# Patient Record
Sex: MALE | Race: OTHER | HISPANIC OR LATINO
[De-identification: names, ages, dates, MRNs, and addresses within clinical notes are randomized per-mention and may not be internally consistent; named-entity substitution may affect disease eponyms.]

---

## 2017-12-01 ENCOUNTER — LABORATORY RESULT (OUTPATIENT)
Age: 27
End: 2017-12-01

## 2017-12-01 ENCOUNTER — APPOINTMENT (OUTPATIENT)
Dept: INTERNAL MEDICINE | Facility: CLINIC | Age: 27
End: 2017-12-01

## 2017-12-01 ENCOUNTER — OUTPATIENT (OUTPATIENT)
Dept: OUTPATIENT SERVICES | Facility: HOSPITAL | Age: 27
LOS: 1 days | End: 2017-12-01
Payer: SELF-PAY

## 2017-12-01 VITALS
BODY MASS INDEX: 24.27 KG/M2 | DIASTOLIC BLOOD PRESSURE: 70 MMHG | WEIGHT: 162 LBS | HEIGHT: 68.5 IN | SYSTOLIC BLOOD PRESSURE: 130 MMHG

## 2017-12-01 DIAGNOSIS — R29.898 OTHER SYMPTOMS AND SIGNS INVOLVING THE MUSCULOSKELETAL SYSTEM: ICD-10-CM

## 2017-12-01 DIAGNOSIS — I10 ESSENTIAL (PRIMARY) HYPERTENSION: ICD-10-CM

## 2017-12-01 DIAGNOSIS — Z78.9 OTHER SPECIFIED HEALTH STATUS: ICD-10-CM

## 2017-12-01 DIAGNOSIS — F17.200 NICOTINE DEPENDENCE, UNSPECIFIED, UNCOMPLICATED: ICD-10-CM

## 2017-12-01 PROCEDURE — 85027 COMPLETE CBC AUTOMATED: CPT

## 2017-12-01 PROCEDURE — 86803 HEPATITIS C AB TEST: CPT

## 2017-12-01 PROCEDURE — 90688 IIV4 VACCINE SPLT 0.5 ML IM: CPT

## 2017-12-01 PROCEDURE — G0008: CPT

## 2017-12-01 PROCEDURE — 80053 COMPREHEN METABOLIC PANEL: CPT

## 2017-12-01 PROCEDURE — G0463: CPT

## 2017-12-01 PROCEDURE — 87389 HIV-1 AG W/HIV-1&-2 AB AG IA: CPT

## 2017-12-01 PROCEDURE — 90471 IMMUNIZATION ADMIN: CPT

## 2017-12-01 PROCEDURE — 86780 TREPONEMA PALLIDUM: CPT

## 2017-12-01 PROCEDURE — 80061 LIPID PANEL: CPT

## 2017-12-01 PROCEDURE — 90715 TDAP VACCINE 7 YRS/> IM: CPT

## 2017-12-01 RX ORDER — PSYLLIUM HUSK 0.4 G
1000-800 CAPSULE ORAL
Qty: 90 | Refills: 3 | Status: ACTIVE | COMMUNITY
Start: 2017-12-01

## 2017-12-01 RX ORDER — ACETAMINOPHEN 325 MG/1
325 TABLET, FILM COATED ORAL
Qty: 30 | Refills: 0 | Status: ACTIVE | COMMUNITY
Start: 2017-12-01

## 2017-12-02 LAB
ALBUMIN SERPL ELPH-MCNC: 4.9 G/DL — SIGNIFICANT CHANGE UP (ref 3.3–5)
ALP SERPL-CCNC: 75 U/L — SIGNIFICANT CHANGE UP (ref 40–120)
ALT FLD-CCNC: 12 U/L — SIGNIFICANT CHANGE UP (ref 10–45)
ANION GAP SERPL CALC-SCNC: 17 MMOL/L — SIGNIFICANT CHANGE UP (ref 5–17)
AST SERPL-CCNC: 19 U/L — SIGNIFICANT CHANGE UP (ref 10–40)
BILIRUB SERPL-MCNC: 0.4 MG/DL — SIGNIFICANT CHANGE UP (ref 0.2–1.2)
BUN SERPL-MCNC: 16 MG/DL — SIGNIFICANT CHANGE UP (ref 7–23)
C TRACH RRNA SPEC QL NAA+PROBE: SIGNIFICANT CHANGE UP
CALCIUM SERPL-MCNC: 9.9 MG/DL — SIGNIFICANT CHANGE UP (ref 8.4–10.5)
CHLORIDE SERPL-SCNC: 100 MMOL/L — SIGNIFICANT CHANGE UP (ref 96–108)
CHOLEST SERPL-MCNC: 144 MG/DL — SIGNIFICANT CHANGE UP (ref 10–199)
CO2 SERPL-SCNC: 26 MMOL/L — SIGNIFICANT CHANGE UP (ref 22–31)
CREAT SERPL-MCNC: 0.92 MG/DL — SIGNIFICANT CHANGE UP (ref 0.5–1.3)
GLUCOSE SERPL-MCNC: 74 MG/DL — SIGNIFICANT CHANGE UP (ref 70–99)
HCT VFR BLD CALC: 45.8 % — SIGNIFICANT CHANGE UP (ref 39–50)
HCV AB S/CO SERPL IA: 0.16 S/CO — SIGNIFICANT CHANGE UP
HCV AB SERPL-IMP: SIGNIFICANT CHANGE UP
HDLC SERPL-MCNC: 51 MG/DL — SIGNIFICANT CHANGE UP (ref 40–125)
HGB BLD-MCNC: 15.8 G/DL — SIGNIFICANT CHANGE UP (ref 13–17)
HIV 1+2 AB+HIV1 P24 AG SERPL QL IA: SIGNIFICANT CHANGE UP
LIPID PNL WITH DIRECT LDL SERPL: 76 MG/DL — SIGNIFICANT CHANGE UP
MCHC RBC-ENTMCNC: 29.9 PG — SIGNIFICANT CHANGE UP (ref 27–34)
MCHC RBC-ENTMCNC: 34.5 GM/DL — SIGNIFICANT CHANGE UP (ref 32–36)
MCV RBC AUTO: 86.6 FL — SIGNIFICANT CHANGE UP (ref 80–100)
N GONORRHOEA RRNA SPEC QL NAA+PROBE: SIGNIFICANT CHANGE UP
PLATELET # BLD AUTO: 210 K/UL — SIGNIFICANT CHANGE UP (ref 150–400)
POTASSIUM SERPL-MCNC: 4.5 MMOL/L — SIGNIFICANT CHANGE UP (ref 3.5–5.3)
POTASSIUM SERPL-SCNC: 4.5 MMOL/L — SIGNIFICANT CHANGE UP (ref 3.5–5.3)
PROT SERPL-MCNC: 8.2 G/DL — SIGNIFICANT CHANGE UP (ref 6–8.3)
RBC # BLD: 5.29 M/UL — SIGNIFICANT CHANGE UP (ref 4.2–5.8)
RBC # FLD: 13.4 % — SIGNIFICANT CHANGE UP (ref 10.3–14.5)
SODIUM SERPL-SCNC: 143 MMOL/L — SIGNIFICANT CHANGE UP (ref 135–145)
SPECIMEN SOURCE: SIGNIFICANT CHANGE UP
T PALLIDUM AB TITR SER: NEGATIVE — SIGNIFICANT CHANGE UP
TOTAL CHOLESTEROL/HDL RATIO MEASUREMENT: 2.8 RATIO — LOW (ref 3.4–9.6)
TRIGL SERPL-MCNC: 83 MG/DL — SIGNIFICANT CHANGE UP (ref 10–149)
WBC # BLD: 6.98 K/UL — SIGNIFICANT CHANGE UP (ref 3.8–10.5)
WBC # FLD AUTO: 6.98 K/UL — SIGNIFICANT CHANGE UP (ref 3.8–10.5)

## 2017-12-11 DIAGNOSIS — M75.81 OTHER SHOULDER LESIONS, RIGHT SHOULDER: ICD-10-CM

## 2017-12-11 DIAGNOSIS — Z78.9 OTHER SPECIFIED HEALTH STATUS: ICD-10-CM

## 2017-12-11 DIAGNOSIS — Z11.3 ENCOUNTER FOR SCREENING FOR INFECTIONS WITH A PREDOMINANTLY SEXUAL MODE OF TRANSMISSION: ICD-10-CM

## 2017-12-11 DIAGNOSIS — Z23 ENCOUNTER FOR IMMUNIZATION: ICD-10-CM

## 2017-12-11 DIAGNOSIS — F17.200 NICOTINE DEPENDENCE, UNSPECIFIED, UNCOMPLICATED: ICD-10-CM

## 2017-12-11 DIAGNOSIS — R29.898 OTHER SYMPTOMS AND SIGNS INVOLVING THE MUSCULOSKELETAL SYSTEM: ICD-10-CM

## 2018-01-02 ENCOUNTER — FORM ENCOUNTER (OUTPATIENT)
Age: 28
End: 2018-01-02

## 2018-01-03 ENCOUNTER — OUTPATIENT (OUTPATIENT)
Dept: OUTPATIENT SERVICES | Facility: HOSPITAL | Age: 28
LOS: 1 days | End: 2018-01-03
Payer: SELF-PAY

## 2018-01-03 ENCOUNTER — APPOINTMENT (OUTPATIENT)
Dept: ORTHOPEDIC SURGERY | Facility: HOSPITAL | Age: 28
End: 2018-01-03

## 2018-01-03 VITALS
RESPIRATION RATE: 16 BRPM | BODY MASS INDEX: 23.82 KG/M2 | SYSTOLIC BLOOD PRESSURE: 135 MMHG | DIASTOLIC BLOOD PRESSURE: 77 MMHG | WEIGHT: 159 LBS | HEART RATE: 76 BPM | HEIGHT: 68.5 IN

## 2018-01-03 DIAGNOSIS — M25.561 PAIN IN RIGHT KNEE: ICD-10-CM

## 2018-01-03 DIAGNOSIS — M75.81 OTHER SHOULDER LESIONS, RIGHT SHOULDER: ICD-10-CM

## 2018-01-03 DIAGNOSIS — M79.643 PAIN IN UNSPECIFIED HAND: ICD-10-CM

## 2018-01-03 PROCEDURE — G0463: CPT

## 2018-01-03 PROCEDURE — 73564 X-RAY EXAM KNEE 4 OR MORE: CPT

## 2018-01-03 PROCEDURE — 73030 X-RAY EXAM OF SHOULDER: CPT | Mod: 26,RT

## 2018-01-03 PROCEDURE — 73030 X-RAY EXAM OF SHOULDER: CPT

## 2018-01-03 PROCEDURE — 73564 X-RAY EXAM KNEE 4 OR MORE: CPT | Mod: 26,RT

## 2018-02-06 ENCOUNTER — FORM ENCOUNTER (OUTPATIENT)
Age: 28
End: 2018-02-06

## 2018-02-07 ENCOUNTER — OUTPATIENT (OUTPATIENT)
Dept: OUTPATIENT SERVICES | Facility: HOSPITAL | Age: 28
LOS: 1 days | End: 2018-02-07
Payer: SELF-PAY

## 2018-02-07 ENCOUNTER — APPOINTMENT (OUTPATIENT)
Dept: ORTHOPEDIC SURGERY | Facility: HOSPITAL | Age: 28
End: 2018-02-07

## 2018-02-07 ENCOUNTER — OUTPATIENT (OUTPATIENT)
Dept: OUTPATIENT SERVICES | Facility: HOSPITAL | Age: 28
LOS: 1 days | End: 2018-02-07
Payer: COMMERCIAL

## 2018-02-07 ENCOUNTER — APPOINTMENT (OUTPATIENT)
Dept: MRI IMAGING | Facility: CLINIC | Age: 28
End: 2018-02-07
Payer: COMMERCIAL

## 2018-02-07 VITALS
RESPIRATION RATE: 14 BRPM | WEIGHT: 160 LBS | SYSTOLIC BLOOD PRESSURE: 120 MMHG | HEART RATE: 69 BPM | DIASTOLIC BLOOD PRESSURE: 74 MMHG | BODY MASS INDEX: 23.97 KG/M2 | HEIGHT: 68.5 IN

## 2018-02-07 DIAGNOSIS — M79.643 PAIN IN UNSPECIFIED HAND: ICD-10-CM

## 2018-02-07 DIAGNOSIS — M25.511 PAIN IN RIGHT SHOULDER: ICD-10-CM

## 2018-02-07 DIAGNOSIS — M22.2X1 PATELLOFEMORAL DISORDERS, RIGHT KNEE: ICD-10-CM

## 2018-02-07 PROCEDURE — G0463: CPT

## 2018-02-07 PROCEDURE — 70250 X-RAY EXAM OF SKULL: CPT | Mod: 26

## 2018-02-07 PROCEDURE — 70250 X-RAY EXAM OF SKULL: CPT

## 2018-02-07 PROCEDURE — 73221 MRI JOINT UPR EXTREM W/O DYE: CPT

## 2018-02-07 PROCEDURE — 73221 MRI JOINT UPR EXTREM W/O DYE: CPT | Mod: 26,RT

## 2018-02-07 RX ORDER — NAPROXEN 500 MG/1
500 TABLET ORAL
Qty: 30 | Refills: 1 | Status: ACTIVE | COMMUNITY
Start: 2018-02-07 | End: 1900-01-01

## 2018-02-08 DIAGNOSIS — M25.511 PAIN IN RIGHT SHOULDER: ICD-10-CM

## 2018-02-08 DIAGNOSIS — M22.2X1 PATELLOFEMORAL DISORDERS, RIGHT KNEE: ICD-10-CM

## 2018-04-04 ENCOUNTER — APPOINTMENT (OUTPATIENT)
Dept: ORTHOPEDIC SURGERY | Facility: HOSPITAL | Age: 28
End: 2018-04-04

## 2018-07-23 PROBLEM — Z78.9 ALCOHOL USE: Status: ACTIVE | Noted: 2017-12-01

## 2019-03-08 ENCOUNTER — APPOINTMENT (OUTPATIENT)
Dept: INTERNAL MEDICINE | Facility: CLINIC | Age: 29
End: 2019-03-08

## 2019-03-14 ENCOUNTER — LABORATORY RESULT (OUTPATIENT)
Age: 29
End: 2019-03-14

## 2019-03-14 ENCOUNTER — OUTPATIENT (OUTPATIENT)
Dept: OUTPATIENT SERVICES | Facility: HOSPITAL | Age: 29
LOS: 1 days | End: 2019-03-14
Payer: SELF-PAY

## 2019-03-14 ENCOUNTER — APPOINTMENT (OUTPATIENT)
Dept: INTERNAL MEDICINE | Facility: CLINIC | Age: 29
End: 2019-03-14

## 2019-03-14 DIAGNOSIS — Z11.3 ENCOUNTER FOR SCREENING FOR INFECTIONS WITH A PREDOMINANTLY SEXUAL MODE OF TRANSMISSION: ICD-10-CM

## 2019-03-14 DIAGNOSIS — R10.13 EPIGASTRIC PAIN: ICD-10-CM

## 2019-03-14 DIAGNOSIS — I10 ESSENTIAL (PRIMARY) HYPERTENSION: ICD-10-CM

## 2019-03-14 DIAGNOSIS — R53.83 OTHER FATIGUE: ICD-10-CM

## 2019-03-15 PROBLEM — R53.83 FATIGUE: Status: ACTIVE | Noted: 2019-03-15

## 2019-03-15 PROBLEM — R10.13 EPIGASTRIC ABDOMINAL PAIN OF UNKNOWN ETIOLOGY: Status: ACTIVE | Noted: 2019-03-15

## 2019-03-15 PROBLEM — Z11.3 ROUTINE SCREENING FOR STI (SEXUALLY TRANSMITTED INFECTION): Status: ACTIVE | Noted: 2017-12-01

## 2019-03-15 LAB
ALBUMIN SERPL ELPH-MCNC: 4.3 G/DL — SIGNIFICANT CHANGE UP (ref 3.3–5)
ALP SERPL-CCNC: 79 U/L — SIGNIFICANT CHANGE UP (ref 40–120)
ALT FLD-CCNC: 11 U/L — SIGNIFICANT CHANGE UP (ref 10–45)
ANION GAP SERPL CALC-SCNC: 14 MMOL/L — SIGNIFICANT CHANGE UP (ref 5–17)
AST SERPL-CCNC: 17 U/L — SIGNIFICANT CHANGE UP (ref 10–40)
BILIRUB SERPL-MCNC: 0.2 MG/DL — SIGNIFICANT CHANGE UP (ref 0.2–1.2)
BUN SERPL-MCNC: 19 MG/DL — SIGNIFICANT CHANGE UP (ref 7–23)
C TRACH RRNA SPEC QL NAA+PROBE: SIGNIFICANT CHANGE UP
CALCIUM SERPL-MCNC: 9.1 MG/DL — SIGNIFICANT CHANGE UP (ref 8.4–10.5)
CHLORIDE SERPL-SCNC: 108 MMOL/L — SIGNIFICANT CHANGE UP (ref 96–108)
CO2 SERPL-SCNC: 21 MMOL/L — LOW (ref 22–31)
CREAT SERPL-MCNC: 0.98 MG/DL — SIGNIFICANT CHANGE UP (ref 0.5–1.3)
GLUCOSE SERPL-MCNC: 103 MG/DL — HIGH (ref 70–99)
HBA1C BLD-MCNC: 5.2 % — SIGNIFICANT CHANGE UP (ref 4–5.6)
HCT VFR BLD CALC: 45.5 % — SIGNIFICANT CHANGE UP (ref 39–50)
HGB BLD-MCNC: 14.7 G/DL — SIGNIFICANT CHANGE UP (ref 13–17)
HIV 1+2 AB+HIV1 P24 AG SERPL QL IA: SIGNIFICANT CHANGE UP
MCHC RBC-ENTMCNC: 28.3 PG — SIGNIFICANT CHANGE UP (ref 27–34)
MCHC RBC-ENTMCNC: 32.3 GM/DL — SIGNIFICANT CHANGE UP (ref 32–36)
MCV RBC AUTO: 87.5 FL — SIGNIFICANT CHANGE UP (ref 80–100)
N GONORRHOEA RRNA SPEC QL NAA+PROBE: SIGNIFICANT CHANGE UP
PLATELET # BLD AUTO: 237 K/UL — SIGNIFICANT CHANGE UP (ref 150–400)
POTASSIUM SERPL-MCNC: 4.2 MMOL/L — SIGNIFICANT CHANGE UP (ref 3.5–5.3)
POTASSIUM SERPL-SCNC: 4.2 MMOL/L — SIGNIFICANT CHANGE UP (ref 3.5–5.3)
PROT SERPL-MCNC: 7.3 G/DL — SIGNIFICANT CHANGE UP (ref 6–8.3)
RBC # BLD: 5.2 M/UL — SIGNIFICANT CHANGE UP (ref 4.2–5.8)
RBC # FLD: 12.9 % — SIGNIFICANT CHANGE UP (ref 10.3–14.5)
SODIUM SERPL-SCNC: 143 MMOL/L — SIGNIFICANT CHANGE UP (ref 135–145)
SPECIMEN SOURCE: SIGNIFICANT CHANGE UP
TSH SERPL-MCNC: 0.74 UIU/ML — SIGNIFICANT CHANGE UP (ref 0.27–4.2)
WBC # BLD: 7.96 K/UL — SIGNIFICANT CHANGE UP (ref 3.8–10.5)
WBC # FLD AUTO: 7.96 K/UL — SIGNIFICANT CHANGE UP (ref 3.8–10.5)

## 2019-03-15 RX ORDER — FAMOTIDINE 40 MG/1
40 TABLET, FILM COATED ORAL
Qty: 1 | Refills: 3 | Status: ACTIVE | COMMUNITY
Start: 2019-03-14 | End: 1900-01-01

## 2019-03-15 NOTE — ASSESSMENT
[FreeTextEntry1] : 28M h/o partial supraspinatus tear (sees Ortho) presents for acute eval of epigastric abn discomfort. \par \par #epigastric pain\par -gastritis vs PUD\par -check H pylori, limit use of NSAIDs, trial of pepcid \par \par #fatigue\par -check CBC, TSH, instructed on good sleep hygeine habits \par \par #HCM\par -STI screening\par -flu shot given today \par -check basic labs\par -him and his wife are also trying to conceive and given their age, recommend trial of 12 months before infertility w/u\par \par d/w Dr. Marquez \par Cong Caputo PGY3 \par

## 2019-03-15 NOTE — HISTORY OF PRESENT ILLNESS
[FreeTextEntry8] : 28M h/o partial supraspinatus tear (sees Ortho) presents for acute eval of epigastric abn discomfort. \par \par #epigastric pain\par Has had epigastric abn discomfort intermittently over past month. Worse before meals and sometimes improved with food. Has had no fever, BRBPR, melena, N/V. Occasionaly uses NSAIDs for joint pain. Immigrated from [facility] and has never check H pylori. No weight loss, F/C. No dysphagia/odynophagia. \par \par Also reports his wife and him are trying to conceive for the past 5 months without success. Asking if he needs to be worked up. Would like STI testing. Has had some fatigue the past month as well, but sleeps 8 hours a night.

## 2019-03-15 NOTE — PHYSICAL EXAM
[No Acute Distress] : no acute distress [Well Developed] : well developed [Normal Sclera/Conjunctiva] : normal sclera/conjunctiva [EOMI] : extraocular movements intact [Normal Oropharynx] : the oropharynx was normal [Supple] : supple [Clear to Auscultation] : lungs were clear to auscultation bilaterally [Regular Rhythm] : with a regular rhythm [Normal S1, S2] : normal S1 and S2 [No Murmur] : no murmur heard [No Edema] : there was no peripheral edema [Soft] : abdomen soft [Non Tender] : non-tender [Non-distended] : non-distended [No Joint Swelling] : no joint swelling [No Rash] : no rash [Normal Gait] : normal gait [No Focal Deficits] : no focal deficits [Normal Affect] : the affect was normal [Normal Mood] : the mood was normal

## 2019-03-15 NOTE — REVIEW OF SYSTEMS
[Abdominal Pain] : abdominal pain [Fever] : no fever [Vision Problems] : no vision problems [Sore Throat] : no sore throat [Chest Pain] : no chest pain [Shortness Of Breath] : no shortness of breath [Nausea] : no nausea [Constipation] : no constipation [Diarrhea] : no diarrhea [Vomiting] : no vomiting [Heartburn] : no heartburn [Melena] : no melena [Dysuria] : no dysuria [Muscle Pain] : no muscle pain [Skin Rash] : no skin rash [Headache] : no headache [Depression] : no depression [Easy Bleeding] : no easy bleeding

## 2019-03-18 DIAGNOSIS — R53.83 OTHER FATIGUE: ICD-10-CM

## 2019-03-18 DIAGNOSIS — R10.13 EPIGASTRIC PAIN: ICD-10-CM

## 2019-03-18 DIAGNOSIS — Z11.3 ENCOUNTER FOR SCREENING FOR INFECTIONS WITH A PREDOMINANTLY SEXUAL MODE OF TRANSMISSION: ICD-10-CM

## 2019-03-31 ENCOUNTER — LABORATORY RESULT (OUTPATIENT)
Age: 29
End: 2019-03-31

## 2019-04-01 PROCEDURE — 84443 ASSAY THYROID STIM HORMONE: CPT

## 2019-04-01 PROCEDURE — 87591 N.GONORRHOEAE DNA AMP PROB: CPT

## 2019-04-01 PROCEDURE — 36415 COLL VENOUS BLD VENIPUNCTURE: CPT

## 2019-04-01 PROCEDURE — 87338 HPYLORI STOOL AG IA: CPT

## 2019-04-01 PROCEDURE — 87491 CHLMYD TRACH DNA AMP PROBE: CPT

## 2019-04-01 PROCEDURE — 83036 HEMOGLOBIN GLYCOSYLATED A1C: CPT

## 2019-04-01 PROCEDURE — 80053 COMPREHEN METABOLIC PANEL: CPT

## 2019-04-01 PROCEDURE — G0463: CPT

## 2019-04-01 PROCEDURE — 87389 HIV-1 AG W/HIV-1&-2 AB AG IA: CPT

## 2019-04-01 PROCEDURE — 85027 COMPLETE CBC AUTOMATED: CPT

## 2019-04-04 LAB — H PYLORI AG STL QL: SIGNIFICANT CHANGE UP

## 2019-07-24 ENCOUNTER — APPOINTMENT (OUTPATIENT)
Dept: INTERNAL MEDICINE | Facility: CLINIC | Age: 29
End: 2019-07-24

## 2020-01-23 ENCOUNTER — OUTPATIENT (OUTPATIENT)
Dept: OUTPATIENT SERVICES | Facility: HOSPITAL | Age: 30
LOS: 1 days | End: 2020-01-23
Payer: SELF-PAY

## 2020-01-23 ENCOUNTER — APPOINTMENT (OUTPATIENT)
Dept: INTERNAL MEDICINE | Facility: CLINIC | Age: 30
End: 2020-01-23

## 2020-01-23 VITALS — DIASTOLIC BLOOD PRESSURE: 80 MMHG | SYSTOLIC BLOOD PRESSURE: 138 MMHG | BODY MASS INDEX: 25.77 KG/M2 | WEIGHT: 172 LBS

## 2020-01-23 DIAGNOSIS — I10 ESSENTIAL (PRIMARY) HYPERTENSION: ICD-10-CM

## 2020-01-23 DIAGNOSIS — Z80.0 FAMILY HISTORY OF MALIGNANT NEOPLASM OF DIGESTIVE ORGANS: ICD-10-CM

## 2020-01-23 DIAGNOSIS — L70.9 ACNE, UNSPECIFIED: ICD-10-CM

## 2020-01-23 DIAGNOSIS — Z71.6 TOBACCO ABUSE COUNSELING: ICD-10-CM

## 2020-01-23 DIAGNOSIS — Z00.00 ENCOUNTER FOR GENERAL ADULT MEDICAL EXAMINATION W/OUT ABNORMAL FINDINGS: ICD-10-CM

## 2020-01-23 PROCEDURE — G0008: CPT

## 2020-01-23 PROCEDURE — G0463: CPT | Mod: 25

## 2020-01-23 PROCEDURE — 90688 IIV4 VACCINE SPLT 0.5 ML IM: CPT

## 2020-01-23 PROCEDURE — G0009: CPT

## 2020-01-23 PROCEDURE — 90732 PPSV23 VACC 2 YRS+ SUBQ/IM: CPT

## 2020-01-23 RX ORDER — BENZOYL PEROXIDE 2.5 G/100G
2.5 GEL TOPICAL DAILY
Qty: 1 | Refills: 3 | Status: ACTIVE | COMMUNITY
Start: 2020-01-23 | End: 1900-01-01

## 2020-01-23 RX ORDER — DOXYCYCLINE HYCLATE 100 MG/1
100 TABLET ORAL DAILY
Qty: 30 | Refills: 0 | Status: ACTIVE | COMMUNITY
Start: 2020-01-23 | End: 1900-01-01

## 2020-01-23 RX ORDER — PHENYLEPHRINE HCL 10 MG
7 TABLET ORAL DAILY
Qty: 14 | Refills: 0 | Status: ACTIVE | COMMUNITY
Start: 2020-01-23 | End: 1900-01-01

## 2020-01-23 RX ORDER — NICOTINE POLACRILEX 2 MG/1
2 GUM, CHEWING ORAL
Qty: 120 | Refills: 0 | Status: ACTIVE | COMMUNITY
Start: 2020-01-23 | End: 1900-01-01

## 2020-01-23 RX ORDER — CLINDAMYCIN PHOSPHATE 10 MG/ML
1 SOLUTION TOPICAL DAILY
Qty: 1 | Refills: 3 | Status: ACTIVE | COMMUNITY
Start: 2020-01-23 | End: 1900-01-01

## 2020-01-23 NOTE — REVIEW OF SYSTEMS
[Joint Pain] : joint pain [Fever] : no fever [Chills] : no chills [Fatigue] : no fatigue [Vision Problems] : no vision problems [Recent Change In Weight] : ~T no recent weight change [Nasal Discharge] : no nasal discharge [Hearing Loss] : no hearing loss [Sore Throat] : no sore throat [Chest Pain] : no chest pain [Lower Ext Edema] : no lower extremity edema [Palpitations] : no palpitations [Shortness Of Breath] : no shortness of breath [Wheezing] : no wheezing [Cough] : no cough [Abdominal Pain] : no abdominal pain [Nausea] : no nausea [Constipation] : no constipation [Diarrhea] : no diarrhea [Heartburn] : no heartburn [Vomiting] : no vomiting [Dysuria] : no dysuria [Back Pain] : no back pain [Muscle Pain] : no muscle pain [Itching] : no itching [Skin Rash] : no skin rash [Headache] : no headache [de-identified] : acne [Depression] : no depression

## 2020-01-23 NOTE — HISTORY OF PRESENT ILLNESS
[de-identified] : Pt is a 30 yo M w/ PMHx partial supraspinatus tear presents for CPE. Pt brings up following concerns:\par \par #Acne. Pt endorses chronic acne since childhood. The acne is cystic and present on the face, scalp, chest, back. He has used over the counter neutrogena products (cream and face wash) in the past with little improvement. He would like to have a topical antibiotic.\par \par #Smoking cessation: pt has smoked for many years. While he used to smoke 2 cigarettes a day, he now smokes 4. He would like to stop. He used a nicotine patch in the past but thinks he may have used it wrong because he developed the craving for cigarettes as soon as he got off the patch and went back to smoking immediately. \par \par #Shoulder pain: pt has occasional L shoulder pain after he plays volleyball but it improves with prn Advil\par \par Pt brought up difficulty conceiving with his wife last year. States that his wife is now 4 months pregnant.

## 2020-01-23 NOTE — PLAN
[FreeTextEntry1] : Pt is a 28 yo M w/ PMHx partial supraspinatus tear presents for CPE. Pt brings up following concerns:\par \par #Acne\par - prescribed doxycycline 100 mg daily for 1 month. advised to avoid sun exposure and use sun protection because the medication causes light sensitivity\par - topical clindamycin 1% lotion and benzoyl peroxide 2.5% for face, once daily administration\par - recommend moisturizer to administer after topical agents (such as Eucerin, Cetaphil)\par \par #Smoking cessation\par - will start 7 mg daily nicotine patch for 2 weeks. patient can use 2 mg nicotine gum for breakthrough cravings\par - after the two weeks, he can use the gum prn\par \par #HCM\par - CBC, CMP,  A1C, lipid profile\par - flu shot and pneumovax today\par \par #Hypertension\par - /80, 130/80 on repeat. will continue to monitor on subsequent visits\par \par Shana Eaton PGY2\par D/w Dr. Marquez\par RTC in 5 weeks

## 2020-01-23 NOTE — PHYSICAL EXAM
[No Acute Distress] : no acute distress [Well Nourished] : well nourished [Well Developed] : well developed [Normal Sclera/Conjunctiva] : normal sclera/conjunctiva [PERRL] : pupils equal round and reactive to light [Normal Oropharynx] : the oropharynx was normal [No Lymphadenopathy] : no lymphadenopathy [Supple] : supple [No Respiratory Distress] : no respiratory distress  [No Accessory Muscle Use] : no accessory muscle use [Clear to Auscultation] : lungs were clear to auscultation bilaterally [Normal Rate] : normal rate  [Regular Rhythm] : with a regular rhythm [Normal S1, S2] : normal S1 and S2 [No Edema] : there was no peripheral edema [Soft] : abdomen soft [Non Tender] : non-tender [Non-distended] : non-distended [No Rash] : no rash [Acne] : acne [Normal Affect] : the affect was normal [de-identified] : cystic acne on face, scalp, neck, back, chest w/ excoriations

## 2020-01-24 ENCOUNTER — RESULT REVIEW (OUTPATIENT)
Age: 30
End: 2020-01-24

## 2020-01-24 LAB
ALBUMIN SERPL ELPH-MCNC: 4.7 G/DL
ALP BLD-CCNC: 78 U/L
ALT SERPL-CCNC: 15 U/L
ANION GAP SERPL CALC-SCNC: 10 MMOL/L
AST SERPL-CCNC: 11 U/L
BASOPHILS # BLD AUTO: 0.06 K/UL
BASOPHILS NFR BLD AUTO: 0.9 %
BILIRUB SERPL-MCNC: 0.3 MG/DL
BUN SERPL-MCNC: 16 MG/DL
CALCIUM SERPL-MCNC: 9.4 MG/DL
CHLORIDE SERPL-SCNC: 104 MMOL/L
CHOLEST SERPL-MCNC: 126 MG/DL
CHOLEST/HDLC SERPL: 2.5 RATIO
CO2 SERPL-SCNC: 25 MMOL/L
CREAT SERPL-MCNC: 0.88 MG/DL
EOSINOPHIL # BLD AUTO: 0.29 K/UL
EOSINOPHIL NFR BLD AUTO: 4.2 %
ESTIMATED AVERAGE GLUCOSE: 103 MG/DL
GLUCOSE SERPL-MCNC: 87 MG/DL
HBA1C MFR BLD HPLC: 5.2 %
HCT VFR BLD CALC: 47.1 %
HDLC SERPL-MCNC: 50 MG/DL
HGB BLD-MCNC: 15.3 G/DL
IMM GRANULOCYTES NFR BLD AUTO: 0.3 %
LDLC SERPL CALC-MCNC: 63 MG/DL
LYMPHOCYTES # BLD AUTO: 2.74 K/UL
LYMPHOCYTES NFR BLD AUTO: 39.5 %
MAN DIFF?: NORMAL
MCHC RBC-ENTMCNC: 28.5 PG
MCHC RBC-ENTMCNC: 32.5 GM/DL
MCV RBC AUTO: 87.9 FL
MONOCYTES # BLD AUTO: 0.52 K/UL
MONOCYTES NFR BLD AUTO: 7.5 %
NEUTROPHILS # BLD AUTO: 3.3 K/UL
NEUTROPHILS NFR BLD AUTO: 47.6 %
PLATELET # BLD AUTO: 233 K/UL
POTASSIUM SERPL-SCNC: 3.9 MMOL/L
PROT SERPL-MCNC: 7.5 G/DL
RBC # BLD: 5.36 M/UL
RBC # FLD: 12.9 %
SODIUM SERPL-SCNC: 139 MMOL/L
TRIGL SERPL-MCNC: 68 MG/DL
WBC # FLD AUTO: 6.93 K/UL

## 2020-01-27 DIAGNOSIS — Z80.0 FAMILY HISTORY OF MALIGNANT NEOPLASM OF DIGESTIVE ORGANS: ICD-10-CM

## 2020-01-27 DIAGNOSIS — Z71.6 TOBACCO ABUSE COUNSELING: ICD-10-CM

## 2020-01-27 DIAGNOSIS — L70.9 ACNE, UNSPECIFIED: ICD-10-CM

## 2020-01-27 DIAGNOSIS — Z23 ENCOUNTER FOR IMMUNIZATION: ICD-10-CM

## 2020-04-02 ENCOUNTER — APPOINTMENT (OUTPATIENT)
Dept: INTERNAL MEDICINE | Facility: CLINIC | Age: 30
End: 2020-04-02

## 2020-06-19 ENCOUNTER — OUTPATIENT (OUTPATIENT)
Dept: OUTPATIENT SERVICES | Facility: HOSPITAL | Age: 30
LOS: 1 days | End: 2020-06-19
Payer: MEDICARE

## 2020-06-19 DIAGNOSIS — Z11.59 ENCOUNTER FOR SCREENING FOR OTHER VIRAL DISEASES: ICD-10-CM

## 2020-06-19 PROCEDURE — U0003: CPT

## 2020-06-20 LAB — SARS-COV-2 RNA SPEC QL NAA+PROBE: SIGNIFICANT CHANGE UP

## 2021-11-03 ENCOUNTER — APPOINTMENT (OUTPATIENT)
Dept: INTERNAL MEDICINE | Facility: CLINIC | Age: 31
End: 2021-11-03

## 2022-03-08 ENCOUNTER — EMERGENCY (EMERGENCY)
Facility: HOSPITAL | Age: 32
LOS: 1 days | Discharge: HOME | End: 2022-03-08
Attending: EMERGENCY MEDICINE
Payer: MEDICAID

## 2022-03-08 VITALS
WEIGHT: 169.98 LBS | HEIGHT: 66 IN | TEMPERATURE: 99 F | HEART RATE: 87 BPM | DIASTOLIC BLOOD PRESSURE: 76 MMHG | SYSTOLIC BLOOD PRESSURE: 146 MMHG | RESPIRATION RATE: 16 BRPM | OXYGEN SATURATION: 99 %

## 2022-03-08 PROCEDURE — 99053 MED SERV 10PM-8AM 24 HR FAC: CPT

## 2022-03-08 PROCEDURE — 99284 EMERGENCY DEPT VISIT MOD MDM: CPT

## 2022-03-09 VITALS
DIASTOLIC BLOOD PRESSURE: 76 MMHG | OXYGEN SATURATION: 99 % | HEART RATE: 82 BPM | SYSTOLIC BLOOD PRESSURE: 139 MMHG | TEMPERATURE: 98 F | RESPIRATION RATE: 16 BRPM

## 2022-03-09 PROCEDURE — 41800 DRAINAGE OF GUM LESION: CPT

## 2022-03-09 PROCEDURE — 99284 EMERGENCY DEPT VISIT MOD MDM: CPT | Mod: 25

## 2022-03-09 RX ORDER — IBUPROFEN 200 MG
600 TABLET ORAL ONCE
Refills: 0 | Status: COMPLETED | OUTPATIENT
Start: 2022-03-09 | End: 2022-03-09

## 2022-03-09 RX ORDER — ACETAMINOPHEN 500 MG
650 TABLET ORAL ONCE
Refills: 0 | Status: COMPLETED | OUTPATIENT
Start: 2022-03-09 | End: 2022-03-09

## 2022-03-09 RX ADMIN — Medication 1 TABLET(S): at 00:46

## 2022-03-09 RX ADMIN — Medication 650 MILLIGRAM(S): at 00:46

## 2022-03-09 NOTE — ED PROVIDER NOTE - PATIENT PORTAL LINK FT
You can access the FollowMyHealth Patient Portal offered by [facility] by registering at the following website: [email or web address]. By joining [facility]’s FollowMyHealth portal, you will also be able to view your health information using other applications (apps) compatible with our system.

## 2022-03-09 NOTE — ED PROVIDER NOTE - ATTENDING CONTRIBUTION TO CARE
32 yo male with R maxillary dental abscess.  not systemically ill-appearing.  dental consult for I+D.

## 2022-03-09 NOTE — CONSULT NOTE ADULT - SUBJECTIVE AND OBJECTIVE BOX
31 year old male presents to ED with a CC of "dental pain and swelling."  Dental consulted to evaluate intraoral swelling    HPI: As per pt, no significant medical hx. Patient denies past surgical hx   Medications: Patient denies  Allergies: NKDA, NKA    Social hx: Unknown    Dental hx: Patient reports last visiting dentist roughly 7 years ago    EOE: TMJ = WNL. (-) clicks, pops, crepitus  (-) trismus  (+) facial swelling present of right cheek   (+) pain on palpation of right cheek  (+) asymmetry due to right sided facial swelling  (-) LAD  (-) dysphagia   (-) LOC  MOM = FROM.   Pt is alert and cooperative     IOE: Permanent dentition = multiple broken down teeth, multiple caries present clinically. #3 presents with gross M decay, #4 presents with gross D decay. Roots present at site #12, 18, 31, 32.   Fluctuant, doughy swelling noted in buccal vestibule apical to tooth #4. Patient reports pain on palpation of abscess. Teeth #3 and 4 present with pain on percussion and biting   Hard/soft palate: WNL  Tongue/FOM: WNL  No mobility noted clinically    Radiographs: 1 PAN taken and interpreted. PARL noted apical to tooth #4. Gross caries noted #3 & #4. Retained roots #12, 18, 31, 32     Assessment: Gross caries of #4 with PARL and associated soft tissue abscess. I & D indicated. Pt understands ultimately tooth will need RCT or extraction as definitive tx.     Procedure: Recommended I&D. RBA discussed with patient. Verbal and written consent obtained. Administered 2 carpules of 2% lidocaine with 1:100k epi via buccal infiltration around tooth #4 and associated soft tissue abscess. Incised to bone, dissected fascial planes, significant hemopurulence appreciated. Irrigated copiously with saline and placed penrose drain with 1 silk suture. Hemostasis achieved. Patient tolerated procedure well. EBL: 1cc. POIG    Recommendations:  1) Soft diet, pain medication per med team   2) Amoxicillin as prescribed by med team  3) Follow up in 2 days for drain removal at [facility] [phone]  4) Dental F/U with outpatient dentist for comprehensive dental care  5) If any difficulty swallowing/breathing, fever occur, page dental    Lissy Williamson DDS Pager #[phone]

## 2022-03-09 NOTE — ED PROVIDER NOTE - CLINICAL SUMMARY MEDICAL DECISION MAKING FREE TEXT BOX
31M p/w right facial swelling since today morning.  Low grade temp >99 here.  Exam notable for right facial swelling and focal area of swelling inside the mouth around tooth 3-4 c/w periodontal abscess.  No mastoid process tenderness, no concern for mastoiditis.  Will administer analgesics, administer abx, consult dental, reassess, and dispo pending results and consultant recs.

## 2022-03-09 NOTE — ED PROVIDER NOTE - NSFOLLOWUPINSTRUCTIONS_ED_ALL_ED_FT
You were seen for a dental abscess.    This was drained by the dentist.  We are prescribing you antibiotics.  Take these as indicated on the pill bottle.  You received the first dose in the ED.    You can take Tylenol and/or Motrin with meals every 6-8 hours for pain.    Call the dentist tomorrow morning to make an appointment in 2-3 days.    If you have any severe increase in pain, fever, uncontrollable nausea vomiting, inability to tolerate eating and drinking, or any other concerning symptoms, return immediately to the Emergency Department.

## 2022-03-09 NOTE — ED PROVIDER NOTE - PHYSICAL EXAMINATION
GENERAL: Patient awake alert NAD.  HEENT: NC/AT, Moist mucous membranes, focal area of swelling on the gum around tooth 3-4 w/ some pus drainage on expression.   MSK: No deformities.  NEURO: A&Ox3. Moving all extremities.  SKIN: Warm and dry. No rash.  PSYCH: Normal affect.

## 2022-03-09 NOTE — ED PROVIDER NOTE - PROGRESS NOTE DETAILS
[place] PGY3 - Pt. evaluated by dentist and underwent successful I&D.  First dose abx given here, remainder rx to pharmacy.  Pt. has dental card, will call to have appt in 2-3 days.  Pt. aware and agrees w/ plan.  All other questions and concerns have been addressed.  Pt. will be dc/d.

## 2022-03-09 NOTE — ED ADULT NURSE NOTE - OBJECTIVE STATEMENT
32 y/o male presenting to Ed ambulatory, A&ox3, complaining of dental pain. pt has pmh of dental abscesses. pt c/o of right dental abscess that has worsened in pain and facial swelling. pt denies chest pain, shortness of breath, difficulty swallowing, n/v/d, fevers, headache. pt able to speak in full complete sentences.right side facial swelling present. Safety and comfort measures provided, bed locked and in lowest position, side rails up for safety. Call bell within reach. Awaiting md alvarez

## 2022-03-09 NOTE — ED PROVIDER NOTE - OBJECTIVE STATEMENT
31M w/ no significant PMHx p/w right dental abscess and facial swelling since today morning that has progressively worsened.  Had similar sxs on the left side of his face 5 years ago that resolved after drainage and abx.  Denies any trauma or recent dental procedures.  No f/c, sick contacts, CP, SOB, abd pain, n/v/d/c, urinary sxs, rectal bleeding.

## 2023-03-28 PROBLEM — Z78.9 OTHER SPECIFIED HEALTH STATUS: Chronic | Status: ACTIVE | Noted: 2022-03-09

## 2023-05-30 ENCOUNTER — APPOINTMENT (OUTPATIENT)
Dept: INTERNAL MEDICINE | Facility: CLINIC | Age: 33
End: 2023-05-30

## 2024-08-27 NOTE — ED ADULT NURSE NOTE - HOW MANY DRINKS CONTAINING ALCOHOL DO YOU HAVE ON A TYPICAL DAY WHEN YOU ARE DRINKING?
[Time Spent: ___ minutes] : I have spent [unfilled] minutes of time on the encounter which excludes teaching and separately reported services. 1 or 2